# Patient Record
Sex: FEMALE | Race: OTHER
[De-identification: names, ages, dates, MRNs, and addresses within clinical notes are randomized per-mention and may not be internally consistent; named-entity substitution may affect disease eponyms.]

---

## 2018-05-16 ENCOUNTER — HOSPITAL ENCOUNTER (EMERGENCY)
Dept: HOSPITAL 12 - ER | Age: 43
Discharge: HOME | End: 2018-05-16
Payer: COMMERCIAL

## 2018-05-16 VITALS — HEIGHT: 59 IN | BODY MASS INDEX: 29.64 KG/M2 | WEIGHT: 147 LBS

## 2018-05-16 DIAGNOSIS — R10.10: Primary | ICD-10-CM

## 2018-05-16 LAB
ALP SERPL-CCNC: 54 U/L (ref 50–136)
ALT SERPL W/O P-5'-P-CCNC: 24 U/L (ref 14–59)
AST SERPL-CCNC: 19 U/L (ref 15–37)
BASOPHILS # BLD AUTO: 0 K/UL (ref 0–8)
BASOPHILS NFR BLD AUTO: 0.6 % (ref 0–2)
BILIRUB DIRECT SERPL-MCNC: 0.1 MG/DL (ref 0–0.2)
BILIRUB SERPL-MCNC: 0.3 MG/DL (ref 0.2–1)
BUN SERPL-MCNC: 12 MG/DL (ref 7–18)
CHLORIDE SERPL-SCNC: 103 MMOL/L (ref 98–107)
CO2 SERPL-SCNC: 24 MMOL/L (ref 21–32)
CREAT SERPL-MCNC: 0.8 MG/DL (ref 0.6–1.3)
EOSINOPHIL # BLD AUTO: 0.2 K/UL (ref 0–0.7)
EOSINOPHIL NFR BLD AUTO: 2.2 % (ref 0–7)
GLUCOSE SERPL-MCNC: 89 MG/DL (ref 74–106)
HCT VFR BLD AUTO: 41.4 % (ref 31.2–41.9)
HGB BLD-MCNC: 14.4 G/DL (ref 10.9–14.3)
LIPASE SERPL-CCNC: 162 U/L (ref 73–393)
LYMPHOCYTES # BLD AUTO: 2.6 K/UL (ref 20–40)
LYMPHOCYTES NFR BLD AUTO: 35.8 % (ref 20.5–51.5)
MCH RBC QN AUTO: 31.1 UUG (ref 24.7–32.8)
MCHC RBC AUTO-ENTMCNC: 35 G/DL (ref 32.3–35.6)
MCV RBC AUTO: 89.6 FL (ref 75.5–95.3)
MONOCYTES # BLD AUTO: 0.4 K/UL (ref 2–10)
MONOCYTES NFR BLD AUTO: 6.1 % (ref 0–11)
NEUTROPHILS # BLD AUTO: 4 K/UL (ref 1.8–8.9)
NEUTROPHILS NFR BLD AUTO: 55.3 % (ref 38.5–71.5)
PLATELET # BLD AUTO: 197 K/UL (ref 179–408)
POTASSIUM SERPL-SCNC: 3.6 MMOL/L (ref 3.5–5.1)
RBC # BLD AUTO: 4.63 MIL/UL (ref 3.63–4.92)
WBC # BLD AUTO: 7.3 K/UL (ref 3.8–11.8)
WS STN SPEC: 7.1 G/DL (ref 6.4–8.2)

## 2018-05-16 PROCEDURE — A4663 DIALYSIS BLOOD PRESSURE CUFF: HCPCS

## 2018-05-16 NOTE — NUR
Patient reports still feels some nausea when she stood up, requests something 
for nausea. MD notified.

## 2018-05-17 VITALS — SYSTOLIC BLOOD PRESSURE: 132 MMHG | DIASTOLIC BLOOD PRESSURE: 92 MMHG

## 2018-06-12 ENCOUNTER — HOSPITAL ENCOUNTER (EMERGENCY)
Dept: HOSPITAL 54 - ER | Age: 43
Discharge: HOME | End: 2018-06-12
Payer: COMMERCIAL

## 2018-06-12 VITALS — HEIGHT: 62 IN | WEIGHT: 120 LBS | BODY MASS INDEX: 22.08 KG/M2

## 2018-06-12 VITALS — SYSTOLIC BLOOD PRESSURE: 139 MMHG | DIASTOLIC BLOOD PRESSURE: 74 MMHG
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
## 2018-06-12 DIAGNOSIS — Z77.29: Primary | ICD-10-CM

## 2018-06-12 PROCEDURE — 86706 HEP B SURFACE ANTIBODY: CPT

## 2018-06-12 PROCEDURE — 99284 EMERGENCY DEPT VISIT MOD MDM: CPT

## 2018-06-12 PROCEDURE — A4606 OXYGEN PROBE USED W OXIMETER: HCPCS

## 2018-06-12 PROCEDURE — 86803 HEPATITIS C AB TEST: CPT

## 2018-06-12 PROCEDURE — 90471 IMMUNIZATION ADMIN: CPT

## 2018-06-12 PROCEDURE — 36415 COLL VENOUS BLD VENIPUNCTURE: CPT

## 2018-06-12 PROCEDURE — 90715 TDAP VACCINE 7 YRS/> IM: CPT

## 2018-06-12 PROCEDURE — Z7610: HCPCS
